# Patient Record
(demographics unavailable — no encounter records)

---

## 2020-05-24 NOTE — RAD
CHEST ONE VIEW:

 

History: Lightheadness, shortness of breath. 

 

Comparison: None

 

FINDINGS: 

Lungs are clear. No pneumothorax or effusion. Cardiac silhouette and mediastinal contours are within 
normal limits. 

 

IMPRESSION: 

No acute intrathoracic abnormality. 

 

POS: HOME